# Patient Record
Sex: FEMALE | Race: WHITE
[De-identification: names, ages, dates, MRNs, and addresses within clinical notes are randomized per-mention and may not be internally consistent; named-entity substitution may affect disease eponyms.]

---

## 2019-04-02 ENCOUNTER — HOSPITAL ENCOUNTER (OUTPATIENT)
Dept: HOSPITAL 88 - OR | Age: 64
Discharge: HOME | End: 2019-04-02
Attending: OPHTHALMOLOGY
Payer: MEDICARE

## 2019-04-02 VITALS — DIASTOLIC BLOOD PRESSURE: 77 MMHG | SYSTOLIC BLOOD PRESSURE: 154 MMHG

## 2019-04-02 DIAGNOSIS — H25.12: Primary | ICD-10-CM

## 2019-04-02 DIAGNOSIS — E11.9: ICD-10-CM

## 2019-04-02 DIAGNOSIS — I10: ICD-10-CM

## 2019-04-02 DIAGNOSIS — Z79.84: ICD-10-CM

## 2019-04-02 PROCEDURE — 66984 XCAPSL CTRC RMVL W/O ECP: CPT

## 2019-04-02 PROCEDURE — 82948 REAGENT STRIP/BLOOD GLUCOSE: CPT

## 2019-04-02 PROCEDURE — 36415 COLL VENOUS BLD VENIPUNCTURE: CPT

## 2019-04-02 NOTE — XMS REPORT
Patient Summary Document

                             Created on: 2019



SARITA CHING

External Reference #: [id number]

: 1955

Sex: Female



Demographics







                          Address                   [address] [place]  [address]

 

                          Home Phone                [phone]

 

                          Preferred Language        Unknown

 

                          Marital Status            Unknown

 

                          Evangelical Affiliation     Unknown

 

                          Race                      Unknown

 

                                        Additional Race(s)  

 

                          Ethnic Group              Unknown





Author







                          Author                    [facility]

 

                          Address                   Unknown

 

                          Phone                     Unavailable







Support







                Name            Relationship    Address         Phone

 

                    ESTRELLA COLEMAN    PRS                 [address] [place]  [address]                     [phone]

 

                    ESTRELLA COLEMAN    PRS                 [address] [place]  [address]                     [phone]







Care Team Providers







                    Care Team Member Name    Role                Phone

 

                          Unavailable               Unavailable







Payers







             Payer Name    Policy Type    Policy Number    Effective Date    Expiration Date







Problems

This patient has no known problems.



Allergies, Adverse Reactions, Alerts







          Allergy Name    Allergy Type    Status    Severity    Reaction(s)    Onset Date    Inactive 

Date                      Treating Clinician        Comments

 

        No Known Allergies    DA      Active    U               2019 00:00:00                     

 

        No Known Allergies    DA      Active    U               2017 00:00:00                     







Medications

This patient has no known medications.

## 2019-04-03 NOTE — OPERATIVE REPORT
DATE OF PROCEDURE:  04/02/2019

 

SURGEON:  Joao Payne MD

 

PREOPERATIVE DIAGNOSIS:  Dense white cataract to the left eye.

 

POSTOPERATIVE DIAGNOSIS:  Dense white cataract to the left eye.

 

PROCEDURE:  Complicated phacoemulsification with posterior chamber intraocular lens.

 

ANESTHESIA:  MAC.

 

COMPLICATIONS:  None.

 

LENS:  Gurjit SN60WF 20.5 diopter lens.

 

DESCRIPTION OF PROCEDURE:  The patient was taken to the operating room, where she had

tetracaine drops placed on the eye.  The patient's eye was prepped and draped in usual

sterile ophthalmic way.  A lid speculum was placed in the left eye.  A sideport incision

was made.  0.2 mL of 1% lidocaine preservative free was injected in the anterior

chamber.  __________ BSS solution was used to irrigate this out.  Viscoelastic was

placed in the anterior chamber and a keratome was used to make a temporal clear corneal

incision.  A cystotome and Utrata forceps were used to create anterior capsulorrhexis

without any complications.  Hydrodissection was then performed.  Phacoemulsification

probe was placed into the eye and the nucleus was phacoemulsified using the divide and

conquer technique.  Irrigation aspiration handpiece was then used to removed any

residual cortical material.  Viscoelastic was placed in the capsular bag and an Gurjit

SN60WF lens was placed in the bag without any complication.  Irrigation aspiration

handpiece was used to remove any residual viscoelastic material from within the eye.

Miostat was injected into the anterior chamber and the wound was checked to make sure

there was no evidence of leakage.  Two drops of Vigamox were placed in the eye

__________ placed in the eye.  The patient tolerated the procedure well.  She will be

seen in my office tomorrow. 

 

 

 

 

______________________________

Joao Payne MD

 

SES/MODL

D:  04/02/2019 17:40:17

T:  04/03/2019 00:48:59

Job #:  [id number]

## 2019-04-10 NOTE — OPERATIVE REPORT
DATE OF PROCEDURE:  04/02/2019

 

SURGEON:  Joao Payne MD

 

PREOPERATIVE DIAGNOSIS:  Dense white cataract to the left eye.

 

POSTOPERATIVE DIAGNOSIS:  Dense white cataract to the left eye.

 

PROCEDURE:  Complicated phacoemulsification with posterior chamber intraocular lens.

 

ANESTHESIA:  MAC.

 

COMPLICATIONS:  None.

 

LENS:  Gurjit SN60WF 20.0 diopter lens.

 

DESCRIPTION OF PROCEDURE:  The patient was taken to the operating room, where the

patient had tetracaine 0.5% drops placed in the eye.  The patient's eye was prepped and

draped in usual sterile ophthalmic way.  A lid speculum was placed in the eye.  A

sideport incision was made.  A 0.2 mL of 1% lidocaine preservative free was injected in

the anterior chamber.  An air bubble was injected in the anterior chamber and trypan

blue dye was used to stain the capsule.  Once this was done, BSS solution was used to

irrigate this off.  Viscoelastic was placed in the anterior chamber and a keratome was

used to make a temporal clear corneal incision.  A cystotome and Utrata forceps were

used to create anterior capsulorrhexis without any complications.  Hydrodissection and

hydrodelineation were then performed and a good fluid wave was noted.

Phacoemulsification probe was placed into the eye and the nucleus was phacoemulsified

using the divide and conquer technique.  Irrigation aspiration handpiece was then used

to remove any residual cortical material.  Viscoelastic was placed in the capsular bag

and an Gurjit SN60WF lens was placed in the bag without any complication.  Irrigation

aspiration handpiece was used to remove any residual viscoelastic material from within

the eye.  Miostat was injected into the anterior chamber.  The wound was checked to make

sure there was no evidence of leakage.  Two drops of Vigamox were placed in the eye

along with Maxitrol ointment and a patch and Tolliver shield.  The patient tolerated the

procedure well and was taken to the recovery room in good condition.  The patient will

be seen in my office tomorrow in my clinic. 

 

 

 

 

______________________________

MD LEONEL Merlos/MODL

D:  04/09/2019 17:29:12

T:  04/10/2019 00:42:47

Job #:  [id number]

## 2019-04-16 ENCOUNTER — HOSPITAL ENCOUNTER (OUTPATIENT)
Dept: HOSPITAL 88 - OR | Age: 64
Discharge: HOME | End: 2019-04-16
Attending: OPHTHALMOLOGY
Payer: MEDICARE

## 2019-04-16 VITALS — SYSTOLIC BLOOD PRESSURE: 115 MMHG | DIASTOLIC BLOOD PRESSURE: 58 MMHG

## 2019-04-16 DIAGNOSIS — E11.9: ICD-10-CM

## 2019-04-16 DIAGNOSIS — I10: ICD-10-CM

## 2019-04-16 DIAGNOSIS — Z79.84: ICD-10-CM

## 2019-04-16 DIAGNOSIS — H25.11: Primary | ICD-10-CM

## 2019-04-16 PROCEDURE — 66984 XCAPSL CTRC RMVL W/O ECP: CPT

## 2019-04-16 PROCEDURE — 82948 REAGENT STRIP/BLOOD GLUCOSE: CPT

## 2019-04-16 PROCEDURE — 36415 COLL VENOUS BLD VENIPUNCTURE: CPT

## 2021-10-13 ENCOUNTER — HOSPITAL ENCOUNTER (EMERGENCY)
Dept: HOSPITAL 88 - FSED | Age: 66
Discharge: HOME | End: 2021-10-13
Payer: MEDICARE

## 2021-10-13 VITALS — WEIGHT: 180 LBS | HEIGHT: 60 IN | BODY MASS INDEX: 35.34 KG/M2

## 2021-10-13 DIAGNOSIS — S40.011A: ICD-10-CM
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
## 2021-10-13 DIAGNOSIS — S00.83XA: Primary | ICD-10-CM
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
## 2021-10-13 DIAGNOSIS — S13.4XXA: ICD-10-CM

## 2021-10-13 DIAGNOSIS — S80.02XA: ICD-10-CM

## 2021-10-13 PROCEDURE — 72125 CT NECK SPINE W/O DYE: CPT

## 2021-10-13 PROCEDURE — 99284 EMERGENCY DEPT VISIT MOD MDM: CPT

## 2021-10-13 PROCEDURE — 71045 X-RAY EXAM CHEST 1 VIEW: CPT

## 2021-10-13 PROCEDURE — 70450 CT HEAD/BRAIN W/O DYE: CPT

## 2024-12-18 ENCOUNTER — HOSPITAL ENCOUNTER (EMERGENCY)
Dept: HOSPITAL 88 - ER | Age: 69
Discharge: HOME | End: 2024-12-18
Payer: MEDICARE

## 2024-12-18 VITALS — RESPIRATION RATE: 24 BRPM | OXYGEN SATURATION: 96 % | HEART RATE: 74 BPM

## 2024-12-18 VITALS — WEIGHT: 200 LBS | BODY MASS INDEX: 39.27 KG/M2 | HEIGHT: 60 IN

## 2024-12-18 VITALS — OXYGEN SATURATION: 98 % | TEMPERATURE: 98.3 F | HEART RATE: 73 BPM

## 2024-12-18 DIAGNOSIS — J06.9: ICD-10-CM

## 2024-12-18 DIAGNOSIS — R53.81: ICD-10-CM

## 2024-12-18 DIAGNOSIS — Z11.52: ICD-10-CM

## 2024-12-18 DIAGNOSIS — E11.9: ICD-10-CM

## 2024-12-18 DIAGNOSIS — R05.9: Primary | ICD-10-CM

## 2024-12-18 DIAGNOSIS — I10: ICD-10-CM

## 2024-12-18 DIAGNOSIS — R53.1: ICD-10-CM

## 2024-12-18 LAB
COMMENT: (no result)
CORONAVIRUS COVID-19 AG: NEGATIVE
INFLUENZA A AG: NEGATIVE
INFLUENZA B AG: NEGATIVE
S PYO AG THROAT QL: NEGATIVE

## 2024-12-18 PROCEDURE — 83518 IMMUNOASSAY DIPSTICK: CPT

## 2024-12-18 PROCEDURE — 71046 X-RAY EXAM CHEST 2 VIEWS: CPT

## 2024-12-18 PROCEDURE — 94640 AIRWAY INHALATION TREATMENT: CPT

## 2024-12-18 PROCEDURE — 93005 ELECTROCARDIOGRAM TRACING: CPT

## 2024-12-18 PROCEDURE — 99283 EMERGENCY DEPT VISIT LOW MDM: CPT

## 2024-12-18 PROCEDURE — 94799 UNLISTED PULMONARY SVC/PX: CPT

## 2024-12-18 PROCEDURE — 87070 CULTURE OTHR SPECIMN AEROBIC: CPT

## 2024-12-18 RX ADMIN — IPRATROPIUM BROMIDE AND ALBUTEROL SULFATE STA ML: .5; 2.5 SOLUTION RESPIRATORY (INHALATION) at 15:33
